# Patient Record
Sex: FEMALE | Race: BLACK OR AFRICAN AMERICAN
[De-identification: names, ages, dates, MRNs, and addresses within clinical notes are randomized per-mention and may not be internally consistent; named-entity substitution may affect disease eponyms.]

---

## 2017-05-02 ENCOUNTER — HOSPITAL ENCOUNTER (EMERGENCY)
Dept: HOSPITAL 61 - ER | Age: 32
Discharge: HOME | End: 2017-05-02
Payer: COMMERCIAL

## 2017-05-02 VITALS — DIASTOLIC BLOOD PRESSURE: 68 MMHG | SYSTOLIC BLOOD PRESSURE: 118 MMHG

## 2017-05-02 VITALS — HEIGHT: 65 IN | WEIGHT: 125 LBS | BODY MASS INDEX: 20.83 KG/M2

## 2017-05-02 DIAGNOSIS — R09.1: Primary | ICD-10-CM

## 2017-05-02 DIAGNOSIS — F12.10: ICD-10-CM

## 2017-05-02 DIAGNOSIS — Z88.2: ICD-10-CM

## 2017-05-02 LAB
ANION GAP SERPL CALC-SCNC: 6 MMOL/L (ref 6–14)
BASOPHILS # BLD AUTO: 0 X10^3/UL (ref 0–0.2)
BASOPHILS NFR BLD: 0 % (ref 0–3)
BUN SERPL-MCNC: 11 MG/DL (ref 7–20)
CALCIUM SERPL-MCNC: 9.1 MG/DL (ref 8.5–10.1)
CHLORIDE SERPL-SCNC: 103 MMOL/L (ref 98–107)
CK SERPL-CCNC: 94 U/L (ref 26–192)
CK SERPL-CCNC: 97 U/L (ref 26–192)
CKMB MASS: < 0.5 NG/ML (ref 0–3.6)
CO2 SERPL-SCNC: 27 MMOL/L (ref 21–32)
CREAT SERPL-MCNC: 0.7 MG/DL (ref 0.6–1)
EOSINOPHIL NFR BLD: 2 % (ref 0–3)
ERYTHROCYTE [DISTWIDTH] IN BLOOD BY AUTOMATED COUNT: 13 % (ref 11.5–14.5)
GFR SERPLBLD BASED ON 1.73 SQ M-ARVRAT: 118.9 ML/MIN
GLUCOSE SERPL-MCNC: 91 MG/DL (ref 70–99)
HCT VFR BLD CALC: 40.3 % (ref 36–47)
HGB BLD-MCNC: 13.3 G/DL (ref 12–15.5)
INR PPP: 1 (ref 0.8–1.1)
LYMPHOCYTES # BLD: 2.7 X10^3/UL (ref 1–4.8)
LYMPHOCYTES NFR BLD AUTO: 22 % (ref 24–48)
MCH RBC QN AUTO: 30 PG (ref 25–35)
MCHC RBC AUTO-ENTMCNC: 33 G/DL (ref 31–37)
MCV RBC AUTO: 90 FL (ref 79–100)
MONOCYTES NFR BLD: 10 % (ref 0–9)
NEUTROPHILS NFR BLD AUTO: 65 % (ref 31–73)
PLATELET # BLD AUTO: 287 X10^3/UL (ref 140–400)
POTASSIUM SERPL-SCNC: 3.9 MMOL/L (ref 3.5–5.1)
PROTHROMBIN TIME: 12.6 SEC (ref 11.7–14)
RBC # BLD AUTO: 4.48 X10^6/UL (ref 3.5–5.4)
SODIUM SERPL-SCNC: 136 MMOL/L (ref 136–145)
WBC # BLD AUTO: 12.1 X10^3/UL (ref 4–11)

## 2017-05-02 PROCEDURE — 80048 BASIC METABOLIC PNL TOTAL CA: CPT

## 2017-05-02 PROCEDURE — 36415 COLL VENOUS BLD VENIPUNCTURE: CPT

## 2017-05-02 PROCEDURE — 83690 ASSAY OF LIPASE: CPT

## 2017-05-02 PROCEDURE — 96361 HYDRATE IV INFUSION ADD-ON: CPT

## 2017-05-02 PROCEDURE — 84484 ASSAY OF TROPONIN QUANT: CPT

## 2017-05-02 PROCEDURE — 81025 URINE PREGNANCY TEST: CPT

## 2017-05-02 PROCEDURE — 83880 ASSAY OF NATRIURETIC PEPTIDE: CPT

## 2017-05-02 PROCEDURE — 85379 FIBRIN DEGRADATION QUANT: CPT

## 2017-05-02 PROCEDURE — 82550 ASSAY OF CK (CPK): CPT

## 2017-05-02 PROCEDURE — 96374 THER/PROPH/DIAG INJ IV PUSH: CPT

## 2017-05-02 PROCEDURE — 93005 ELECTROCARDIOGRAM TRACING: CPT

## 2017-05-02 PROCEDURE — 85610 PROTHROMBIN TIME: CPT

## 2017-05-02 PROCEDURE — 84703 CHORIONIC GONADOTROPIN ASSAY: CPT

## 2017-05-02 PROCEDURE — 99285 EMERGENCY DEPT VISIT HI MDM: CPT

## 2017-05-02 PROCEDURE — 85027 COMPLETE CBC AUTOMATED: CPT

## 2017-05-02 PROCEDURE — 71020: CPT

## 2017-05-02 PROCEDURE — 82553 CREATINE MB FRACTION: CPT

## 2017-05-02 NOTE — PHYS DOC
Past Medical History


Past Medical History:  Other


Additional Past Medical Histor:  PLEURISY


Past Surgical History:  No Surgical History


Alcohol Use:  None


Drug Use:  Marijuana





Adult General


Chief Complaint


Chief Complaint:  CHEST WALL PAIN





HPI


HPI


Patient is a pleasant 32-year-old female with a remote history of pleurisy 

about 6 or 7 years ago who presents with chest pain that began earlier this 

morning. Pain is described as a sharp stabbing center of her chest over her 

xiphoid process readying to the center of his manubrium chest pain is worse 

with lifting moving her chest wall and moving her arms. Described as a sharp 

with no radiation to her back and shoulders or neck. Patient denies any 

shortness of breath, cough, URI symptoms, direct trauma, nausea, vomiting, 

diarrhea, fevers, chills, recent travel or trauma. Patient took some ibuprofen 

today which makes it somewhat better but not improved anxiety. She was advised 

to have an evaluation at the emergency department given the duration of 

symptoms.





Review of Systems


Review of Systems





Constitutional: Denies fever or chills []


Eyes: Denies change in visual acuity, redness, or eye pain []


HENT: Denies nasal congestion or sore throat []


Respiratory: Patient truly denies shortness of breath other than when her chest 

wall hurts and she has to rest her bleeding secondary to the pain.


Cardiovascular: No additional information not addressed in HPI []


GI: Denies abdominal pain, nausea, vomiting, bloody stools or diarrhea []


: Denies dysuria or hematuria []


Musculoskeletal: Denies back pain or joint pain []


Integument: Denies rash or skin lesions []


Neurologic: Denies headache, focal weakness or sensory changes []


Endocrine: Denies polyuria or polydipsia []





Current Medications


Current Medications





 Current Medications








 Medications


  (Trade)  Dose


 Ordered  Sig/Herlinda  Start Time


 Stop Time Status Last Admin


Dose Admin


 


 Aspirin 324 mg  324 mg  1X  ONCE  5/2/17 19:00


 5/2/17 19:01 DC 5/2/17 18:48


324 MG


 


 Ketorolac


 Tromethamine


  (Toradol)  30 mg  1X  ONCE  5/2/17 19:00


 5/2/17 19:01 DC 5/2/17 18:51


30 MG


 


 Sodium Chloride


  (Iv Sodium


 Chloride 0.9%


 1000ml Bag)  1,000 ml @ 


 1,000 mls/hr  Q1H  5/2/17 18:19


 5/2/17 19:18 DC 5/2/17 18:51


1,000 MLS/HR


 


 Sodium Chloride


  (Normal Saline


 Flush)  10 ml  QSHIFT  PRN  5/2/17 18:30


     


 











Allergies


Allergies





 Allergies








Coded Allergies Type Severity Reaction Last Updated Verified


 


  Sulfa (Sulfonamide Antibiotics) Allergy Intermediate Unknown 2/16/16 Yes











Physical Exam


Physical Exam





Constitutional: Well developed, well nourished, no acute distress, non-toxic 

appearance. []


HENT: Normocephalic, atraumatic, bilateral external ears normal, oropharynx 

moist, no oral exudates, nose normal. []


Eyes: PERRLA, EOMI, conjunctiva normal, no discharge. [] 


Neck: Normal range of motion, no tenderness, supple, no stridor. [] 


Cardiovascular:Heart rate regular rhythm, no murmur []


Lungs & Thorax: Bilateral breath sounds clear to auscultation bilateral no 

wheezes rhonchi rales or crackles. Patient has easily reproducible chest wall 

pain over the sternum and xiphoid process exactly reproduce her symptoms. She 

has pain with rotational motion of her shoulder at the left more than the 

right. No evidence of skin wall changes no contusions, no rash. 


Abdomen: Bowel sounds normal, soft, no tenderness, no masses, no pulsatile 

masses. [] 


Skin: Warm, dry, no erythema, no rash. [] 


Back: No tenderness, no CVA tenderness. [] 


Extremities: No tenderness, no cyanosis, no clubbing, ROM intact, no edema. [] 


Neurologic: Alert and oriented X 3, normal motor function, normal sensory 

function, no focal deficits noted. []


Psychologic: Affect normal, judgement normal, mood normal. []





Current Patient Data


Vital Signs





 Vital Signs








  Date Time  Temp Pulse Resp B/P Pulse Ox O2 Delivery O2 Flow Rate FiO2


 


5/2/17 18:11 99 83 18 137/83 99 Room Air  





 99.0       








Lab Values





 Laboratory Tests








Test


  5/2/17


17:39 5/2/17


18:49


 


POC Urine HCG, Qualitative


  Hcg negative


(Negative) 


 


 


White Blood Count


  


  12.1x10^3/uL


(4.0-11.0)  H


 


Red Blood Count


  


  4.48x10^6/uL


(3.50-5.40)


 


Hemoglobin


  


  13.3g/dL


(12.0-15.5)


 


Hematocrit


  


  40.3%


(36.0-47.0)


 


Mean Corpuscular Volume  90fL ()  


 


Mean Corpuscular Hemoglobin  30pg (25-35)  


 


Mean Corpuscular Hemoglobin


Concent 


  33g/dL (31-37)


 


 


Red Cell Distribution Width


  


  13.0%


(11.5-14.5)


 


Platelet Count


  


  287x10^3/uL


(140-400)


 


Neutrophils (%) (Auto)  65% (31-73)  


 


Lymphocytes (%) (Auto)  22% (24-48)  L


 


Monocytes (%) (Auto)  10% (0-9)  H


 


Eosinophils (%) (Auto)  2% (0-3)  


 


Basophils (%) (Auto)  0% (0-3)  


 


Neutrophils # (Auto)


  


  7.8x10^3uL


(1.8-7.7)  H


 


Lymphocytes # (Auto)


  


  2.7x10^3/uL


(1.0-4.8)


 


Monocytes # (Auto)


  


  1.2x10^3/uL


(0.0-1.1)  H


 


Eosinophils # (Auto)


  


  0.3x10^3/uL


(0.0-0.7)


 


Basophils # (Auto)


  


  0.0x10^3/uL


(0.0-0.2)


 


Prothrombin Time


  


  12.6SEC


(11.7-14.0)


 


Prothrombin Time INR  1.0 (0.8-1.1)  


 


D-Dimer (Toña)


  


  0.27ug/mlFEU


(0.00-0.50)


 


Sodium Level


  


  136mmol/L


(136-145)


 


Potassium Level


  


  3.9mmol/L


(3.5-5.1)


 


Chloride Level


  


  103mmol/L


()


 


Carbon Dioxide Level


  


  27mmol/L


(21-32)


 


Anion Gap  6 (6-14)  


 


Blood Urea Nitrogen


  


  11mg/dL (7-20)


 


 


Creatinine


  


  0.7mg/dL


(0.6-1.0)


 


Estimated GFR


(Cockcroft-Gault) 


  118.9  


 


 


Glucose Level


  


  91mg/dL


(70-99)


 


Calcium Level


  


  9.1mg/dL


(8.5-10.1)


 


Creatine Kinase


  


  94U/L ()


 


 


Creatine Kinase MB (Mass)


  


  < 0.5ng/mL


(0.0-3.6)


 


Creatine Kinase MB Relative


Index 


  0.5% (0-4)  


 


 


Troponin I Quantitative


  


  < 0.017ng/mL


(0.000-0.055)


 


NT-Pro-B-Type Natriuretic


Peptide 


  < 5pg/mL


(0-124)


 


Lipase


  


  59U/L ()


L





 Laboratory Tests


5/2/17 18:49








 Laboratory Tests


5/2/17 18:49











EKG


EKG


[EKG time 1618 p.m. 5/2/2017 shows normal sinus rhythm normal IL interval and 

normal P wave number QRS normal ST segment, T-wave normal-looking EKG.]





Radiology/Procedures


Radiology/Procedures


[Patient is x-ray dated 52 2/7/17 time 1841 normal cardiac shadow no acute 

pulmonary infiltrates lungs are inflated there is no soda Medicare there is no 

pleural effusions noted no pneumothorax subcutaneousair bony inferences and 

normal soft tissue and normal as well.]





Course & Med Decision Making


Course & Med Decision Making


Pertinent Labs and Imaging studies reviewed. (See chart for details)





[Patient presents with chest wall pain very reminiscent of her prior expensive 

pleurisy but her shortness of breath is low but more discomforting than normal. 

She has no risk factors for pulmonary embolism PERC negative but she does have 

some recent travel less than 6 hours in a vehicle.  Differential diagnosis 

includes but is not entirely inclusive acute coronary syndrome, pulmonary 

medicine, pericardial effusion, pericardial tampon, mediastinitis, pneumothorax,

]





Urine hCG was negative, patient's elevated white blood cell count of 12.1 and 

is really given duration of symptoms is blood cell count acute stress reaction 

to pain. Otherwise normal





   Pleuritis is an inflammation of the lung pleura and causes pleuritic chest 

pain. Causes include autoimmune diseases (eg, systemic lupus erythematosus) and 

drugs (eg, procainamide, hydralazine, isoniazid). Associated systemic signs and 

symptoms of autoimmune disease include fever, rash, arthralgias, and 

constitutional symptoms.  She did not suffer any of these other symptoms,.  

Discussed ways to treat pleurisy to follow-up in the future.





Dragon Disclaimer


Dragon Disclaimer


This electronic medical record was generated, in whole or in part, using a 

voice recognition dictation system.





Departure


Departure


Impression:  


 Primary Impression:  


 Pleurisy


Disposition:  01 HOME, SELF-CARE


Condition:  IMPROVED


Referrals:  


UNKNOWN PCP NAME (PCP)


Patient Instructions:  Chest Pain (Nonspecific), Pleurisy





Additional Instructions:


You have been diagnosed with chest pain and pleurisy although there is no 

evidence of damage to her heart at this time this does not mean he did not have 

heart disease. Please return for any new or increasing symptoms or Any 

questions or concerns. I would advise a follow-up with your regular Dr. for 

outpatient evaluation if symptoms continue I would also discouraged the use of 

marijuana until you're seen.


Scripts


Naproxen Sodium 550 Mg [id number] Mg PO BID PRN PAIN #20 TAB


   Prov:CRIS TURK MD         5/2/17








CRIS TURK MD May 2, 2017 18:32

## 2017-05-03 NOTE — RAD
Indication chest pain.



PA and lateral views of the chest were obtained. No prior imaging of the chest

is available.



The heart, pulmonary vessels and mediastinum appear normal. The lungs are

clear. Bony structures appear grossly intact.



IMPRESSION: Normal study

## 2017-05-03 NOTE — EKG
[facility]

               [address] [place] [address]

Test Date:    2017               Test Time:    18:18:55

Pat Name:     RICARDO CABALLERO           Department:   

Patient ID:   [id number]           Room:          

Gender:       F                        Technician:   

:          1985               Requested By: CRIS TURK

Order Number: [id number]            Reading MD:   Roseann Hardin

                                 Measurements

Intervals                              Axis          

Rate:         71                       P:            58

NJ:           156                      QRS:          62

QRSD:         86                       T:            62

QT:           376                                    

QTc:          413                                    

                           Interpretive Statements

SINUS RHYTHM

NORMAL ECG

RI6.01

No previous ECG available for comparison



Electronically Signed On 5-3-2017 20:46:18 CDT by Roseann Hardin

## 2020-08-08 ENCOUNTER — HOSPITAL ENCOUNTER (EMERGENCY)
Dept: HOSPITAL 61 - ER | Age: 35
Discharge: HOME | End: 2020-08-08
Payer: COMMERCIAL

## 2020-08-08 VITALS — HEIGHT: 64 IN | WEIGHT: 132.28 LBS | BODY MASS INDEX: 22.58 KG/M2

## 2020-08-08 VITALS — SYSTOLIC BLOOD PRESSURE: 125 MMHG | DIASTOLIC BLOOD PRESSURE: 76 MMHG

## 2020-08-08 DIAGNOSIS — J45.909: ICD-10-CM

## 2020-08-08 DIAGNOSIS — Z87.891: ICD-10-CM

## 2020-08-08 DIAGNOSIS — F12.90: ICD-10-CM

## 2020-08-08 DIAGNOSIS — R06.02: ICD-10-CM

## 2020-08-08 DIAGNOSIS — Z88.2: ICD-10-CM

## 2020-08-08 DIAGNOSIS — R07.81: ICD-10-CM

## 2020-08-08 DIAGNOSIS — R09.1: Primary | ICD-10-CM

## 2020-08-08 LAB
ALBUMIN SERPL-MCNC: 3.8 G/DL (ref 3.4–5)
ALBUMIN/GLOB SERPL: 0.9 {RATIO} (ref 1–1.7)
ALP SERPL-CCNC: 66 U/L (ref 46–116)
ALT SERPL-CCNC: 14 U/L (ref 14–59)
ANION GAP SERPL CALC-SCNC: 8 MMOL/L (ref 6–14)
AST SERPL-CCNC: 13 U/L (ref 15–37)
BASOPHILS # BLD AUTO: 0 X10^3/UL (ref 0–0.2)
BASOPHILS NFR BLD: 1 % (ref 0–3)
BILIRUB SERPL-MCNC: 0.5 MG/DL (ref 0.2–1)
BUN SERPL-MCNC: 8 MG/DL (ref 7–20)
BUN/CREAT SERPL: 9 (ref 6–20)
CALCIUM SERPL-MCNC: 8.8 MG/DL (ref 8.5–10.1)
CHLORIDE SERPL-SCNC: 103 MMOL/L (ref 98–107)
CO2 SERPL-SCNC: 27 MMOL/L (ref 21–32)
CREAT SERPL-MCNC: 0.9 MG/DL (ref 0.6–1)
EOSINOPHIL NFR BLD: 0.1 X10^3/UL (ref 0–0.7)
EOSINOPHIL NFR BLD: 2 % (ref 0–3)
ERYTHROCYTE [DISTWIDTH] IN BLOOD BY AUTOMATED COUNT: 12.7 % (ref 11.5–14.5)
GFR SERPLBLD BASED ON 1.73 SQ M-ARVRAT: 87.3 ML/MIN
GLOBULIN SER-MCNC: 4.1 G/DL (ref 2.2–3.8)
GLUCOSE SERPL-MCNC: 98 MG/DL (ref 70–99)
HCT VFR BLD CALC: 39.1 % (ref 36–47)
HGB BLD-MCNC: 13.1 G/DL (ref 12–15.5)
LYMPHOCYTES # BLD: 2 X10^3/UL (ref 1–4.8)
LYMPHOCYTES NFR BLD AUTO: 32 % (ref 24–48)
MCH RBC QN AUTO: 30 PG (ref 25–35)
MCHC RBC AUTO-ENTMCNC: 34 G/DL (ref 31–37)
MCV RBC AUTO: 89 FL (ref 79–100)
MONO #: 0.7 X10^3/UL (ref 0–1.1)
MONOCYTES NFR BLD: 11 % (ref 0–9)
NEUT #: 3.4 X10^3/UL (ref 1.8–7.7)
NEUTROPHILS NFR BLD AUTO: 55 % (ref 31–73)
PLATELET # BLD AUTO: 317 X10^3/UL (ref 140–400)
POTASSIUM SERPL-SCNC: 4 MMOL/L (ref 3.5–5.1)
PREG TEST PT QUAL: NEGATIVE
PROT SERPL-MCNC: 7.9 G/DL (ref 6.4–8.2)
RBC # BLD AUTO: 4.38 X10^6/UL (ref 3.5–5.4)
SODIUM SERPL-SCNC: 138 MMOL/L (ref 136–145)
WBC # BLD AUTO: 6.2 X10^3/UL (ref 4–11)

## 2020-08-08 PROCEDURE — 84484 ASSAY OF TROPONIN QUANT: CPT

## 2020-08-08 PROCEDURE — 93005 ELECTROCARDIOGRAM TRACING: CPT

## 2020-08-08 PROCEDURE — 71045 X-RAY EXAM CHEST 1 VIEW: CPT

## 2020-08-08 PROCEDURE — 36415 COLL VENOUS BLD VENIPUNCTURE: CPT

## 2020-08-08 PROCEDURE — 99285 EMERGENCY DEPT VISIT HI MDM: CPT

## 2020-08-08 PROCEDURE — 83880 ASSAY OF NATRIURETIC PEPTIDE: CPT

## 2020-08-08 PROCEDURE — 85379 FIBRIN DEGRADATION QUANT: CPT

## 2020-08-08 PROCEDURE — 80053 COMPREHEN METABOLIC PANEL: CPT

## 2020-08-08 PROCEDURE — 84703 CHORIONIC GONADOTROPIN ASSAY: CPT

## 2020-08-08 PROCEDURE — 85025 COMPLETE CBC W/AUTO DIFF WBC: CPT

## 2020-08-08 NOTE — PHYS DOC
Past Medical History


Past Medical History:  Asthma, Bronchitis, Other


Additional Past Medical Histor:  PLEURISY


Past Surgical History:  No Surgical History


Smoking Status:  Light Tobacco Smoker


Alcohol Use:  None


Drug Use:  Marijuana





General Adult


EDM:


Chief Complaint:  CHEST WALL PAIN





HPI:


HPI:





Patient is a 35  year old female who presents with 3-week history of right-sided

pleuritic chest pain pain is moderate in intensity and worse with deep breaths. 

Patient has some mild shortness of breath due to the pain.  Pain is located on 

the right side of her chest and radiates from the back into the front.  Patient 

denies any fever or cough.  Patient was seen at [facility] a week and a half ago

and diagnosed with pleurisy and placed on antibiotics.  Patient states symptoms 

are not getting better.  Patient's had off and on pleurisy for many years.





Review of Systems:


Review of Systems:


Constitutional:   Denies fever or chills. []


Eyes:   Denies change in visual acuity. []


HENT:   Denies nasal congestion or sore throat. [] 


Respiratory:   Denies cough but has mild shortness of breath


Cardiovascular: Complains of right-sided chest pain


GI:   Denies abdominal pain, nausea, vomiting, bloody stools or diarrhea. [] 


:  Denies dysuria. [] 


Musculoskeletal:   Complains of right thoracic pain


Integument:   Denies rash. [] 


Neurologic:   Denies headache, focal weakness or sensory changes. [] 


Endocrine:   Denies polyuria or polydipsia. [] 


Lymphatic:  Denies swollen glands. [] 


Psychiatric:  Denies depression or anxiety. []





Heart Score:


HEART Score for Chest Pain:  








HEART Score for Chest Pain Response (Comments) Value


 


History Slighlty/Non-Suspicious 0


 


ECG Normal 0


 


Age < 45 0


 


Risk Factors No Risk Factors 0


 


Troponin < Normal Limit 0


 


Total  0








Risk Factors:


Risk Factors:  DM, Current or recent (<one month) smoker, HTN, HLP, family 

history of CAD, obesity.


Risk Scores:


Score 0 - 3:  2.5% MACE over next 6 weeks - Discharge Home


Score 4 - 6:  20.3% MACE over next 6 weeks - Admit for Clinical Observation


Score 7 - 10:  72.7% MACE over next 6 weeks - Early Invasive Strategies





Allergies:


Allergies:





Allergies








Coded Allergies Type Severity Reaction Last Updated Verified


 


  Sulfa (Sulfonamide Antibiotics) Allergy Intermediate Unknown 16 Yes











Physical Exam:


PE:





Constitutional: Well developed, well nourished, no acute distress, non-toxic 

appearance. []


HENT: Normocephalic, atraumatic, bilateral external ears normal, no trismus nose

 normal. []


Eyes: PERRLA, EOMI, conjunctiva normal, no discharge. [] 


Neck: Normal range of motion, no tenderness, supple, no stridor. [] 


Cardiovascular:Heart rate regular rhythm, peripheral pulses intact, cap refill 

normal


Lungs & Thorax:  Bilateral breath sounds clear, no respiratory distress


Abdomen: soft, no tenderness, no masses, no pulsatile masses. [] 


Skin: Warm, dry, no erythema, no rash. [] 


Back: No tenderness, no CVA tenderness. [] 


Extremities: No tenderness, no cyanosis, no clubbing, ROM intact, no edema. [] 


Neurologic: Alert and oriented X 3, normal motor function, normal sensory 

function, no focal deficits noted. []


Psychologic: Affect normal, judgement normal, mood normal. []





Current Patient Data:


Labs:





Laboratory Tests








Test


 20


08:15


 


White Blood Count 6.2 x10^3/uL 


 


Red Blood Count 4.38 x10^6/uL 


 


Hemoglobin 13.1 g/dL 


 


Hematocrit 39.1 % 


 


Mean Corpuscular Volume 89 fL 


 


Mean Corpuscular Hemoglobin 30 pg 


 


Mean Corpuscular Hemoglobin


Concent 34 g/dL 





 


Red Cell Distribution Width 12.7 % 


 


Platelet Count 317 x10^3/uL 


 


Neutrophils (%) (Auto) 55 % 


 


Lymphocytes (%) (Auto) 32 % 


 


Monocytes (%) (Auto) 11 % 


 


Eosinophils (%) (Auto) 2 % 


 


Basophils (%) (Auto) 1 % 


 


Neutrophils # (Auto) 3.4 x10^3/uL 


 


Lymphocytes # (Auto) 2.0 x10^3/uL 


 


Monocytes # (Auto) 0.7 x10^3/uL 


 


Eosinophils # (Auto) 0.1 x10^3/uL 


 


Basophils # (Auto) 0.0 x10^3/uL 


 


D-Dimer (Toña)


 < 0.27


ug/mlFEU


 


Sodium Level 138 mmol/L 


 


Potassium Level 4.0 mmol/L 


 


Chloride Level 103 mmol/L 


 


Carbon Dioxide Level 27 mmol/L 


 


Anion Gap 8 


 


Blood Urea Nitrogen 8 mg/dL 


 


Creatinine 0.9 mg/dL 


 


Estimated GFR


(Cockcroft-Gault) 87.3 





 


BUN/Creatinine Ratio 9 


 


Glucose Level 98 mg/dL 


 


Calcium Level 8.8 mg/dL 


 


Total Bilirubin 0.5 mg/dL 


 


Aspartate Amino Transf


(AST/SGOT) 13 U/L 





 


Alanine Aminotransferase


(ALT/SGPT) 14 U/L 





 


Alkaline Phosphatase 66 U/L 


 


Troponin I Quantitative < 0.017 ng/mL 


 


NT-Pro-B-Type Natriuretic


Peptide 21 pg/mL 





 


Total Protein 7.9 g/dL 


 


Albumin 3.8 g/dL 


 


Albumin/Globulin Ratio 0.9 


 


Serum Pregnancy Test,


Qualitative Negative 











Vital Signs:





                                   Vital Signs








  Date Time  Temp Pulse Resp B/P (MAP) Pulse Ox O2 Delivery O2 Flow Rate FiO2


 


20 07:34 98.1 90 18 121/80 (94) 98 Room Air  





 98.1       











EKG:


EKG:


[] EKG interpreted by me normal sinus rhythm with a rate of 77, normal axis 

normal intervals normal ST segments





Radiology/Procedures:


Radiology/Procedures:


[]General acute hospital


                    [address] [place] [address]


                                 [phone]


                                        


                                 IMAGING REPORT





                                     Signed





PATIENT: RICARDO CABALLERO ACCOUNT: [id number]     MRN#: [id number]


: 1985           LOCATION: ER              AGE: 35


SEX: F                    EXAM DT: 20         ACCESSION#: [id number]


STATUS: REG ER            ORD. PHYSICIAN: UMA BRIGGS MD


REASON: R CP


PROCEDURE: PORTABLE CHEST 1V





PORTABLE CHEST 1V


 


History: Reason: R CP / Spl. Instructions:  / History: 


 


Comparison: May 2, 2017


 


Findings:


No consolidation or pleural effusion. Normal heart size. No pneumothorax.


 


Impression: 


1.  No acute cardiopulmonary process.


 


Electronically signed by: Dez George DO (2020 8:14 AM) [facility]














DICTATED and SIGNED BY:     DEZ GEORGE DO


DATE:     20





Course & Med Decision Making:


Course & Med Decision Making


Pertinent Labs and Imaging studies reviewed. (See chart for details)





[] 35-year-old female presents with subacute on chronic right-sided chest wall 

pain.  Patient has atypical symptoms and normal EKG and normal troponin, doubt 

acute coronary syndrome.  Patient has a negative d-dimer and stable vital signs,

 doubt pulmonary embolism.  Most likely the patient has pleurisy.  Patient was 

placed on steroids.  Patient clinically stable for discharge and follow-up.  

Return precautions given.





Dragon Disclaimer:


Dragon Disclaimer:


This electronic medical record was generated, in whole or in part, using a voice

 recognition dictation system.





Departure


Departure


Impression:  


   Primary Impression:  


   Pleurisy


   Additional Impression:  


   Right-sided chest pain


Disposition:   HOME, SELF-CARE


Condition:  STABLE


Referrals:  


UNKNOWN PCP NAME (PCP)








pcp


2-3 days


Patient Instructions:  Pleurisy





Additional Instructions:  


EMERGENCY DEPARTMENT GENERAL DISCHARGE INSTRUCTIONS





THANK YOU for coming to  Emergency Department (ED) 

today and 


trusting us with your care.  We trust that you had a positive experience in our 

Emergency 


Department. If you wish to speak to the department Management you can contact 

the department 


Director at [phone].








YOUR FOLLOW UP INSTRUCTIONS ARE AS FOLLOWS: 





Do you have a private doctor? If you do not have a private doctor, please ask 

for a resource 


list of physicians or clinics that may be able to assist you with follow up 

care.  





The Emergency Physician has interpreted your x-rays. The X-ray specialist will 

also review 


them. If there is a change in the findings you will be notified in 48 hours when

 at all 


possible. 





A lab test or lab culture may have been done, your results will be reviewed and 

you will be 


notified if you need a change in treatment. 








ADDITIONAL INSTRUCTIONS AND INFORMATION 





Your care today has been supervised by a physician who is specially trained in 

emergency 


care. Many problems require more than one evaluation for a complete diagnosis 

and treatment. 


We recommend that you schedule your follow up appointment as recommended to 

ensure complete 


treatment of your illness or injury.  If you are unable to obtain follow up care

 and 


continue to have a problem, or if your condition worsens we recommend that you 

return to the 


ED. 





We are not able to safely determine your condition over the phone nor are we 

able to give 


sound medical advice over the phone. For these safety reasons, if you call for 

medical 


advice we will ask you to come to the ED for further evaluation





If you have any questions regarding these discharge instructions please call the

 ED at [phone].





SAFETY INFORMATION





In the interest of safety, wellness, and injury prevention; we encourage you to 

wear your 


seatbelt, if you smoke; quit smoking, and we encourage your family to use 

protective helmet 


for bicycling and other sporting events that present an increased risk for head 

injury. 





IF YOUR SYMPTOMS WORSEN OR NEW SYMPTOMS DEVELOP, OR YOU HAVE CONCERNS ABOUT YOUR

 CONDITION; 


OR IF YOUR CONDITION WORSENS WHILE YOU ARE WAITING FOR YOUR FOLLOW UP 

APPOINTMENT;  EITHER  


CONTACT YOUR PRIMARY CARE DOCTOR, THE PHYSICIAN WHOSE NAME AND NUMBER YOU WERE 

GIVEN,  OR 


RETURN TO THE  ED IMMEDIATELY.


Scripts


Prednisone (PREDNISONE) 20 Mg Tablet


60 MG PO DAILY, #15 TAB


   Prov: UMA BRIGGS MD         20





Justicifation of Admission Dx:


Justifications for Admission:


Justification of Admission Dx:  N/A











UMA BRIGGS MD               Aug 8, 2020 07:56

## 2020-08-08 NOTE — RAD
PORTABLE CHEST 1V

 

History: Reason: R CP / Spl. Instructions:  / History: 

 

Comparison: May 2, 2017

 

Findings:

No consolidation or pleural effusion. Normal heart size. No pneumothorax.

 

Impression: 

1.  No acute cardiopulmonary process.

 

Electronically signed by: Dez George DO (8/8/2020 8:14 AM) [facility]

## 2020-08-11 NOTE — EKG
[facility]

              [address] [place] [address]

Test Date:    2020               Test Time:    08:02:32

Pat Name:     RICARDO CABALLERO           Department:   

Patient ID:   [id number]           Room:          

Gender:       F                        Technician:   

:          1985               Requested By: UMA BRIGGS

Order Number: [id number]           Reading MD:     

                                 Measurements

Intervals                              Axis          

Rate:         77                       P:            64

MD:           164                      QRS:          56

QRSD:         84                       T:            64

QT:           374                                    

QTc:          425                                    

                           Interpretive Statements

SINUS BRADYCARDIA

AXIS ABNORMAL CONSIDERING AGE

ABNORMAL ECG

RI6.01

No previous ECG available for comparison